# Patient Record
Sex: MALE | Race: OTHER | Employment: FULL TIME | URBAN - METROPOLITAN AREA
[De-identification: names, ages, dates, MRNs, and addresses within clinical notes are randomized per-mention and may not be internally consistent; named-entity substitution may affect disease eponyms.]

---

## 2017-05-08 ENCOUNTER — APPOINTMENT (OUTPATIENT)
Dept: GENERAL RADIOLOGY | Age: 22
End: 2017-05-08
Attending: EMERGENCY MEDICINE
Payer: SELF-PAY

## 2017-05-08 ENCOUNTER — HOSPITAL ENCOUNTER (EMERGENCY)
Age: 22
Discharge: HOME | End: 2017-05-08
Attending: EMERGENCY MEDICINE
Payer: SELF-PAY

## 2017-05-08 VITALS
RESPIRATION RATE: 16 BRPM | SYSTOLIC BLOOD PRESSURE: 143 MMHG | HEART RATE: 71 BPM | WEIGHT: 161.38 LBS | OXYGEN SATURATION: 100 % | BODY MASS INDEX: 24.46 KG/M2 | HEIGHT: 68 IN | TEMPERATURE: 97.6 F | DIASTOLIC BLOOD PRESSURE: 62 MMHG

## 2017-05-08 DIAGNOSIS — S43.102A: Primary | ICD-10-CM

## 2017-05-08 PROCEDURE — 99282 EMERGENCY DEPT VISIT SF MDM: CPT

## 2017-05-08 PROCEDURE — L3650 SO 8 ABD RESTRAINT PRE OTS: HCPCS

## 2017-05-08 PROCEDURE — 73030 X-RAY EXAM OF SHOULDER: CPT

## 2017-05-08 RX ORDER — HYDROCODONE BITARTRATE AND ACETAMINOPHEN 5; 325 MG/1; MG/1
1 TABLET ORAL
Qty: 15 TAB | Refills: 0 | Status: SHIPPED | OUTPATIENT
Start: 2017-05-08

## 2017-05-08 RX ORDER — IBUPROFEN 800 MG/1
800 TABLET ORAL
Qty: 20 TAB | Refills: 0 | Status: SHIPPED | OUTPATIENT
Start: 2017-05-08 | End: 2017-05-15

## 2017-05-08 NOTE — ED NOTES
Patient discharged by Usha OLIVO with paperwork and prescriptions.   Pt declined wheelchair and walks with steady gait to mukund

## 2017-05-08 NOTE — LETTER
[address] 
[facility] EMERGENCY DEPT 
[address] [place] [address] [address] [id number] 
[phone] Work/School Note Date: 5/8/2017 To Whom It May concern: 
 
Fito Wise was seen and treated today in the emergency room by the following provider(s): 
Attending Provider: Pepito Pearce MD 
Physician Assistant: PALLAVI Patrick. Fito Wise may return to work on [id number]. Sincerely, PALLAVI Patrick
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
## 2017-05-08 NOTE — DISCHARGE INSTRUCTIONS
[facility] SYSTEMS Departments     For adult and child immunizations, family planning, TB screening, STD testing and women's health services. [address]: [place] [phone]      [facility]  25   [address]   [address]   170 [facility]: Hali Loza [address] [phone]      [address]          [address] [place] [address]     For primary care services, woman and child wellness, and some clinics providing specialty care. [facility] -- [address] [facility] [address] 2525 [facility] [phone]   411 [facility] 200 [facility] 3614 Located within [facility] [phone]   339 [facility] [address] [place] [address] [phone]   [address] [address] [place] [address] 5850  Community  [phone]   [address] [facility] [address] [facility] [address] [place] [phone]   [facility] 81 [facility] [phone]   [facility] [place] [place] [address] [place] [address] [phone]   [facility]: [facility] [address], [address] [facility] [address] [phone]     [place] [address] [facility] [address] [phone]   [facility] 5850 [facility]  [phone]   [employer]  [address], [place] [address] ([email or web address]) [phone]         Sexual Health/Woman Wellness Clinics    For STD/HIV testing and treatment, pregnancy testing and services, men's health, birth control services, LGBT services, and hepatitis/HPV vaccine services. [employer] [employer] for [employer] [place] [employer] [address] 75 [facility] 1579 [address] [phone]   [facility] [address], 5th floor [phone]   Pregnancy [address] [facility] [address]  Leona Farley [phone]         Specialty Service [address]    [phone]   [place]   [phone]   Women, Infant and Children's Services: [address] [phone]       600 [facility]   [phone]   [address]   [facility]     [phone]   [address] Crisis   [address] [facility] [address] [phone]     Local Primary Care Physicians  [facility] [phone]  MD Kervin Rollins MD [place] Service, MD [facility] Community Doctors [phone]  Marcus Vega, P  Corrie Rivera, MD Alley Fields MD [address] [place] [address] [phone]  Raquel Osborn, MD Ebony Kline MD [address] [facility] [phone]  MD America Hernandez MD Alf[facility] Hospital[facility] Shultz, MD Jennifer Wahl MD   [facility] [phone]  [facility] SF, MD Sacha Carpenter, MD Linda Atkins, NP [address] [phone]  MD Andrea Poole MD Alford Spotted, MD Antony Pound, MD Arlo Mariscal, MD Sigmund Grana, MD Regina Resides, MD   33 57 [facility]  Tico Meek MD [address] [phone]  MD Sylvia Merino, KAYLIE Palma, MD Zaki Colon MD Donata Alpha, MD Purcell Jewel, MD   6945 [facility] [phone]  MD Lala Hope, FNP  Levell Ric, NP  Jose Worrell, MD Yovani Barros MD Hilton Lah, MD [facility] [phone]  MD Myron Davis MD Jonny Chock, MD Nancy Miller, MD Nikunj Esquivel MD   [address] [phone]  MD Kathy Monroy MD [facility] [phone]  MD Vimal Randall MD  [facility], [address] [facility] Physicians [phone]  Erika Perez, MD Martha Godoy, MD Kitty Steel, MD Cesilia Madrigal, MD Len Jacobson, MD Pastor Vaughan, KAYLIE Valle MD 1619 [facility]   [phone]  MD Grace Bettencourt, MD Edwige Johnson MD   2102 [facility] [phone]  Ирина Jaeger, MD Joseph Jacome, FNP  Genesis Martins, PA-C  Genesis Martins, FNP  Lorie Grover, PA-EVAN Larry, MD Brooke Johnson, KAYLIE Sarabia DO Miscellaneous:  Molly Crawley [phone]
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
